# Patient Record
Sex: FEMALE | Race: WHITE | NOT HISPANIC OR LATINO | Employment: OTHER | URBAN - METROPOLITAN AREA
[De-identification: names, ages, dates, MRNs, and addresses within clinical notes are randomized per-mention and may not be internally consistent; named-entity substitution may affect disease eponyms.]

---

## 2017-06-19 ENCOUNTER — TELEPHONE (OUTPATIENT)
Dept: HEMATOLOGY/ONCOLOGY | Facility: CLINIC | Age: 57
End: 2017-06-19

## 2017-06-19 DIAGNOSIS — D05.11 INTRADUCTAL CARCINOMA IN SITU OF RIGHT BREAST: Primary | ICD-10-CM

## 2017-06-19 NOTE — TELEPHONE ENCOUNTER
----- Message from Tonya Augustine sent at 6/19/2017  8:33 AM CDT -----  Pt called in would like new standing orders called into [facility] in [place]

## 2017-06-22 LAB
ALBUMIN SERPL-MCNC: 4.3 G/DL (ref 3.6–5.1)
ALBUMIN/GLOB SERPL: 1.5 (CALC) (ref 1–2.5)
ALP SERPL-CCNC: 68 U/L (ref 33–130)
ALT SERPL-CCNC: 19 U/L (ref 6–29)
AST SERPL-CCNC: 24 U/L (ref 10–35)
BASOPHILS # BLD AUTO: 31 CELLS/UL (ref 0–200)
BASOPHILS NFR BLD AUTO: 0.6 %
BILIRUB SERPL-MCNC: 0.5 MG/DL (ref 0.2–1.2)
BUN SERPL-MCNC: 17 MG/DL (ref 7–25)
BUN/CREAT SERPL: ABNORMAL (CALC) (ref 6–22)
CALCIUM SERPL-MCNC: 9.6 MG/DL (ref 8.6–10.4)
CHLORIDE SERPL-SCNC: 103 MMOL/L (ref 98–110)
CO2 SERPL-SCNC: 29 MMOL/L (ref 20–31)
CREAT SERPL-MCNC: 0.8 MG/DL (ref 0.5–1.05)
EOSINOPHIL # BLD AUTO: 156 CELLS/UL (ref 15–500)
EOSINOPHIL NFR BLD AUTO: 3 %
ERYTHROCYTE [DISTWIDTH] IN BLOOD BY AUTOMATED COUNT: 15.1 % (ref 11–15)
GFR SERPL CREATININE-BSD FRML MDRD: 83 ML/MIN/1.73M2
GLOBULIN SER CALC-MCNC: 2.8 G/DL (CALC) (ref 1.9–3.7)
GLUCOSE SERPL-MCNC: 142 MG/DL (ref 65–99)
HCT VFR BLD AUTO: 42.8 % (ref 35–45)
HGB BLD-MCNC: 14 G/DL (ref 11.7–15.5)
LYMPHOCYTES # BLD AUTO: 1591 CELLS/UL (ref 850–3900)
LYMPHOCYTES NFR BLD AUTO: 30.6 %
MCH RBC QN AUTO: 26.6 PG (ref 27–33)
MCHC RBC AUTO-ENTMCNC: 32.6 G/DL (ref 32–36)
MCV RBC AUTO: 81.6 FL (ref 80–100)
MONOCYTES # BLD AUTO: 333 CELLS/UL (ref 200–950)
MONOCYTES NFR BLD AUTO: 6.4 %
NEUTROPHILS # BLD AUTO: 3089 CELLS/UL (ref 1500–7800)
NEUTROPHILS NFR BLD AUTO: 59.4 %
PLATELET # BLD AUTO: 220 THOUSAND/UL (ref 140–400)
PMV BLD REES-ECKER: 9.6 FL (ref 7.5–12.5)
POTASSIUM SERPL-SCNC: 4.2 MMOL/L (ref 3.5–5.3)
PROT SERPL-MCNC: 7.1 G/DL (ref 6.1–8.1)
RBC # BLD AUTO: 5.24 MILLION/UL (ref 3.8–5.1)
SODIUM SERPL-SCNC: 139 MMOL/L (ref 135–146)
WBC # BLD AUTO: 5.2 THOUSAND/UL (ref 3.8–10.8)

## 2017-07-13 ENCOUNTER — OFFICE VISIT (OUTPATIENT)
Dept: HEMATOLOGY/ONCOLOGY | Facility: CLINIC | Age: 57
End: 2017-07-13
Payer: COMMERCIAL

## 2017-07-13 VITALS
TEMPERATURE: 98 F | HEIGHT: 67 IN | WEIGHT: 200.63 LBS | RESPIRATION RATE: 16 BRPM | BODY MASS INDEX: 31.49 KG/M2 | SYSTOLIC BLOOD PRESSURE: 120 MMHG | HEART RATE: 70 BPM | DIASTOLIC BLOOD PRESSURE: 81 MMHG

## 2017-07-13 DIAGNOSIS — D05.11 INTRADUCTAL CARCINOMA IN SITU OF RIGHT BREAST: Chronic | ICD-10-CM

## 2017-07-13 PROCEDURE — 99213 OFFICE O/P EST LOW 20 MIN: CPT | Mod: ,,, | Performed by: INTERNAL MEDICINE

## 2017-07-13 RX ORDER — CHOLECALCIFEROL (VITAMIN D3) 125 MCG
5000 CAPSULE ORAL
COMMUNITY

## 2017-07-13 RX ORDER — MONTELUKAST SODIUM 4 MG/1
4 TABLET, CHEWABLE ORAL
COMMUNITY

## 2017-07-13 RX ORDER — PNV NO.95/FERROUS FUM/FOLIC AC 28MG-0.8MG
TABLET ORAL
COMMUNITY

## 2017-07-13 RX ORDER — HYDROCHLOROTHIAZIDE 25 MG/1
12.5 TABLET ORAL DAILY
Refills: 1 | Status: ON HOLD | COMMUNITY
Start: 2017-07-11 | End: 2019-09-27 | Stop reason: CLARIF

## 2017-07-13 RX ORDER — HYDROXYCHLOROQUINE SULFATE 200 MG/1
200 TABLET, FILM COATED ORAL
COMMUNITY
Start: 2015-12-30

## 2017-07-13 RX ORDER — ACETAMINOPHEN 500 MG
2 TABLET ORAL
COMMUNITY

## 2017-07-13 RX ORDER — BECLOMETHASONE DIPROPIONATE 80 UG/1
AEROSOL, METERED NASAL
COMMUNITY
Start: 2015-11-12 | End: 2021-08-05

## 2017-07-13 NOTE — PROGRESS NOTES
PROGRESS NOTE    Subjective:       Patient ID: Nidia Mckeon is a 56 y.o. female.    Chief Complaint:  No chief complaint on file.      History of Present Illness:   Nidia Mckeon is a 56 y.o. female who presents with a history of dcis of right breast. Here for follow up.  Patient is feeling well. No new complaints.       Family and Social history reviewed and is unchanged from 10/2/2012.       ROS:  Review of Systems   Constitutional: Negative for fever.   Respiratory: Negative for shortness of breath.    Cardiovascular: Negative for chest pain and leg swelling.   Gastrointestinal: Negative for abdominal pain and blood in stool.   Genitourinary: Negative for hematuria.   Skin: Negative for rash.        No current outpatient prescriptions on file.        Objective:       Physical Examination:     There were no vitals taken for this visit.    Physical Exam   Constitutional: She appears well-developed and well-nourished.   HENT:   Head: Normocephalic and atraumatic.   Right Ear: External ear normal.   Left Ear: External ear normal.   Mouth/Throat: Oropharynx is clear and moist.   Eyes: Conjunctivae are normal. Pupils are equal, round, and reactive to light.   Neck: No tracheal deviation present. No thyromegaly present.   Cardiovascular: Normal rate, regular rhythm and normal heart sounds.    Pulmonary/Chest: Effort normal and breath sounds normal.       Abdominal: Soft. Bowel sounds are normal. She exhibits no distension and no mass. There is no tenderness.   Musculoskeletal: She exhibits no edema.   Neurological:   Neuro intact througout   Skin: No rash noted.   Psychiatric: She has a normal mood and affect. Her behavior is normal. Judgment and thought content normal.       Labs:   No results found for this or any previous visit (from the past 336 hour(s)).  CMP  Sodium   Date Value Ref Range Status   06/22/2017 139 135 - 146 mmol/L Final     Potassium   Date Value  Ref Range Status   06/22/2017 4.2 3.5 - 5.3 mmol/L Final     Chloride   Date Value Ref Range Status   06/22/2017 103 98 - 110 mmol/L Final     CO2   Date Value Ref Range Status   06/22/2017 29 20 - 31 mmol/L Final     Glucose   Date Value Ref Range Status   06/22/2017 142 (H) 65 - 99 mg/dL Final     Comment:                   Fasting reference interval     For someone without known diabetes, a glucose  value >125 mg/dL indicates that they may have  diabetes and this should be confirmed with a  follow-up test.          BUN, Bld   Date Value Ref Range Status   06/22/2017 17 7 - 25 mg/dL Final     Creatinine   Date Value Ref Range Status   06/22/2017 0.80 0.50 - 1.05 mg/dL Final     Comment:     For patients >49 years of age, the reference limit  for Creatinine is approximately 13% higher for people  identified as -American.          Calcium   Date Value Ref Range Status   06/22/2017 9.6 8.6 - 10.4 mg/dL Final     Total Protein   Date Value Ref Range Status   06/22/2017 7.1 6.1 - 8.1 g/dL Final     Albumin   Date Value Ref Range Status   06/22/2017 4.3 3.6 - 5.1 g/dL Final     Total Bilirubin   Date Value Ref Range Status   06/22/2017 0.5 0.2 - 1.2 mg/dL Final     Alkaline Phosphatase   Date Value Ref Range Status   06/22/2017 68 33 - 130 U/L Final     AST   Date Value Ref Range Status   06/22/2017 24 10 - 35 U/L Final     ALT   Date Value Ref Range Status   06/22/2017 19 6 - 29 U/L Final     Anion Gap   Date Value Ref Range Status   10/20/2016 15 8 - 16 mmol/L Final     eGFR if    Date Value Ref Range Status   06/22/2017 96 > OR = 60 mL/min/1.73m2 Final     eGFR if non    Date Value Ref Range Status   06/22/2017 83 > OR = 60 mL/min/1.73m2 Final     No results found for: CEA  No results found for: PSA        Assessment/Plan:     Problem List Items Addressed This Visit     DCIS of right breast: s/p bilateral mastectomy 1/23/2012 (Chronic)     S/p bilateral mastectomy  1/23/2012  [id number], Grade III           Other Visit Diagnoses    None.         Discussion:     No Follow-up on file.      Electronically signed by Harish Archuleta

## 2017-07-13 NOTE — ASSESSMENT & PLAN NOTE
S/p bilateral mastectomy 1/23/2012  [id number], Grade III    Patient is beyond five years at this time and doing well.  Can see yearly at this point for exam and history.  Exam negative today.

## 2018-11-13 ENCOUNTER — OFFICE VISIT (OUTPATIENT)
Dept: HEMATOLOGY/ONCOLOGY | Facility: CLINIC | Age: 58
End: 2018-11-13
Payer: COMMERCIAL

## 2018-11-13 VITALS
DIASTOLIC BLOOD PRESSURE: 86 MMHG | BODY MASS INDEX: 34.16 KG/M2 | TEMPERATURE: 98 F | SYSTOLIC BLOOD PRESSURE: 130 MMHG | RESPIRATION RATE: 20 BRPM | HEART RATE: 81 BPM | WEIGHT: 218.13 LBS

## 2018-11-13 DIAGNOSIS — D05.11 INTRADUCTAL CARCINOMA IN SITU OF RIGHT BREAST: Chronic | ICD-10-CM

## 2018-11-13 PROCEDURE — 99213 OFFICE O/P EST LOW 20 MIN: CPT | Mod: ,,, | Performed by: INTERNAL MEDICINE

## 2018-11-13 RX ORDER — BALSALAZIDE DISODIUM 750 MG/1
2250 CAPSULE ORAL 3 TIMES DAILY
Refills: 6 | COMMUNITY
Start: 2018-08-28

## 2018-11-13 NOTE — PROGRESS NOTES
PROGRESS NOTE    Subjective:       Patient ID: Nidia Mckeon is a 58 y.o. female.    Chief Complaint:  Follow-up and Results      History of Present Illness:   Nidia Mckeon is a 58 y.o. female who presents with a history of dcis of right breast. Here for follow up.  Patient is feeling well at this time with no new complaints.        Family and Social history reviewed and is unchanged from 10/2/2012.       ROS:  Review of Systems   Constitutional: Negative for fever.   Respiratory: Negative for shortness of breath.    Cardiovascular: Negative for chest pain and leg swelling.   Gastrointestinal: Negative for abdominal pain and blood in stool.   Genitourinary: Negative for hematuria.   Skin: Negative for rash.          Current Outpatient Medications:     balsalazide (COLAZAL) 750 mg capsule, Take 2,250 mg by mouth 3 (three) times daily., Disp: , Rfl: 6    beclomethasone dipropionate (QNASL) 80 mcg/actuation HFAA, daily. , Disp: , Rfl:     calcium carbonate (CALCIUM 300 ORAL), calcium  qd, Disp: , Rfl:     cholecalciferol, vitamin D3, 5,000 unit capsule, Take 5,000 Units by mouth., Disp: , Rfl:     cyanocobalamin (VITAMIN B-12) 100 MCG tablet, Take by mouth., Disp: , Rfl:     hydrochlorothiazide (HYDRODIURIL) 25 MG tablet, Take 12.5 mg by mouth once daily., Disp: , Rfl: 1    hydroxychloroquine (PLAQUENIL) 200 mg tablet, Take 200 mg by mouth., Disp: , Rfl:     montelukast 4 MG chewable tablet, Take 4 mg by mouth., Disp: , Rfl:     multivitamin capsule, Take 1 capsule by mouth., Disp: , Rfl:     omega 3-dha-epa-fish oil 300-1,000 mg Cap, Take 2 g by mouth., Disp: , Rfl:         Objective:       Physical Examination:     /86   Pulse 81   Temp 97.8 °F (36.6 °C)   Resp 20   Wt 98.9 kg (218 lb 1.6 oz)   BMI 34.16 kg/m²     Physical Exam   Constitutional: She appears well-developed and well-nourished.   HENT:   Head: Normocephalic and atraumatic.    Right Ear: External ear normal.   Left Ear: External ear normal.   Mouth/Throat: Oropharynx is clear and moist.   Eyes: Conjunctivae are normal. Pupils are equal, round, and reactive to light.   Neck: No tracheal deviation present. No thyromegaly present.   Cardiovascular: Normal rate, regular rhythm and normal heart sounds.   Pulmonary/Chest: Effort normal and breath sounds normal.       Abdominal: Soft. Bowel sounds are normal. She exhibits no distension and no mass. There is no tenderness.   Musculoskeletal: She exhibits no edema.   Neurological:   Neuro intact througout   Skin: No rash noted.   Psychiatric: She has a normal mood and affect. Her behavior is normal. Judgment and thought content normal.       Labs:   No results found for this or any previous visit (from the past 336 hour(s)).  CMP  Sodium   Date Value Ref Range Status   06/22/2017 139 135 - 146 mmol/L Final     Potassium   Date Value Ref Range Status   06/22/2017 4.2 3.5 - 5.3 mmol/L Final     Chloride   Date Value Ref Range Status   06/22/2017 103 98 - 110 mmol/L Final     CO2   Date Value Ref Range Status   06/22/2017 29 20 - 31 mmol/L Final     Glucose   Date Value Ref Range Status   06/22/2017 142 (H) 65 - 99 mg/dL Final     Comment:                   Fasting reference interval     For someone without known diabetes, a glucose  value >125 mg/dL indicates that they may have  diabetes and this should be confirmed with a  follow-up test.          BUN, Bld   Date Value Ref Range Status   06/22/2017 17 7 - 25 mg/dL Final     Creatinine   Date Value Ref Range Status   06/22/2017 0.80 0.50 - 1.05 mg/dL Final     Comment:     For patients >49 years of age, the reference limit  for Creatinine is approximately 13% higher for people  identified as -American.          Calcium   Date Value Ref Range Status   06/22/2017 9.6 8.6 - 10.4 mg/dL Final     Total Protein   Date Value Ref Range Status   06/22/2017 7.1 6.1 - 8.1 g/dL Final     Albumin   Date  Value Ref Range Status   06/22/2017 4.3 3.6 - 5.1 g/dL Final     Total Bilirubin   Date Value Ref Range Status   06/22/2017 0.5 0.2 - 1.2 mg/dL Final     Alkaline Phosphatase   Date Value Ref Range Status   06/22/2017 68 33 - 130 U/L Final     AST   Date Value Ref Range Status   06/22/2017 24 10 - 35 U/L Final     ALT   Date Value Ref Range Status   06/22/2017 19 6 - 29 U/L Final     Anion Gap   Date Value Ref Range Status   10/20/2016 15 8 - 16 mmol/L Final     eGFR if    Date Value Ref Range Status   06/22/2017 96 > OR = 60 mL/min/1.73m2 Final     eGFR if non    Date Value Ref Range Status   06/22/2017 83 > OR = 60 mL/min/1.73m2 Final     No results found for: CEA  No results found for: PSA        Assessment/Plan:     Problem List Items Addressed This Visit     DCIS of right breast: s/p bilateral mastectomy 1/23/2012 (Chronic)     Patient is doing well at this time with no evidence of recurrence or new disease.  Exam is negative and labs are unremarkable.  Will continue to see her on a yearly basis.                 Discussion:     Follow-up in about 1 year (around 11/13/2019).      Electronically signed by Harish Archuleta

## 2018-11-13 NOTE — ASSESSMENT & PLAN NOTE
Patient is doing well at this time with no evidence of recurrence or new disease.  Exam is negative and labs are unremarkable.  Will continue to see her on a yearly basis.

## 2018-11-13 NOTE — LETTER
November 13, 2018      Frieda Lee MD  [address] [address]           [facility] - Hematology Oncology  [address]  [address]  [place] [facility] [place] [address]  Phone: [phone]  Fax: [phone]          Patient: Nidia Mckeon   MR Number: [id number]   YOB: 1960   Date of Visit: 11/13/2018       Dear Dr. Frieda Lee:    Thank you for referring Nidia Mckeon to me for evaluation. Attached you will find relevant portions of my assessment and plan of care.    If you have questions, please do not hesitate to call me. I look forward to following Nidia Mckeon along with you.    Sincerely,    Harish Draper MD    Enclosure  CC:  No Recipients    If you would like to receive this communication electronically, please contact [email or web address] or [phone] to request more information on [employer] access.    For providers and/or their staff who would like to refer a patient to [facility], please contact us through our one-stop-shop provider referral line, [facility], at [phone].    If you feel you have received this communication in error or would no longer like to receive these types of communications, please e-mail [email or web address]

## 2019-07-19 PROBLEM — K21.00 ESOPHAGITIS, REFLUX: Status: ACTIVE | Noted: 2019-07-19

## 2019-09-27 PROBLEM — R14.0 ABDOMINAL DISTENTION: Status: ACTIVE | Noted: 2019-09-27

## 2019-11-12 ENCOUNTER — OFFICE VISIT (OUTPATIENT)
Dept: HEMATOLOGY/ONCOLOGY | Facility: CLINIC | Age: 59
End: 2019-11-12
Payer: COMMERCIAL

## 2019-11-12 VITALS
RESPIRATION RATE: 18 BRPM | DIASTOLIC BLOOD PRESSURE: 83 MMHG | HEART RATE: 97 BPM | TEMPERATURE: 98 F | BODY MASS INDEX: 33.67 KG/M2 | WEIGHT: 215 LBS | SYSTOLIC BLOOD PRESSURE: 131 MMHG

## 2019-11-12 DIAGNOSIS — D05.11 INTRADUCTAL CARCINOMA IN SITU OF RIGHT BREAST: Chronic | ICD-10-CM

## 2019-11-12 PROCEDURE — 99213 OFFICE O/P EST LOW 20 MIN: CPT | Mod: S$GLB,,, | Performed by: INTERNAL MEDICINE

## 2019-11-12 PROCEDURE — 99213 PR OFFICE/OUTPT VISIT, EST, LEVL III, 20-29 MIN: ICD-10-PCS | Mod: S$GLB,,, | Performed by: INTERNAL MEDICINE

## 2019-11-12 RX ORDER — METFORMIN HYDROCHLORIDE 500 MG/1
1000 TABLET ORAL
Refills: 1 | COMMUNITY
Start: 2019-11-05

## 2019-11-12 RX ORDER — FLUTICASONE PROPIONATE 50 MCG
2 SPRAY, SUSPENSION (ML) NASAL DAILY
Refills: 1 | COMMUNITY
Start: 2019-09-26

## 2019-11-12 RX ORDER — PANTOPRAZOLE SODIUM 40 MG/1
40 TABLET, DELAYED RELEASE ORAL DAILY
Refills: 1 | COMMUNITY
Start: 2019-10-07 | End: 2021-08-05

## 2019-11-12 RX ORDER — LINACLOTIDE 72 UG/1
CAPSULE, GELATIN COATED ORAL
COMMUNITY
Start: 2019-11-11 | End: 2021-08-05

## 2019-11-12 NOTE — ASSESSMENT & PLAN NOTE
Patient is doing well at this time and she appears MALIKA.  Breast exam is negative and she can continue with yearly follow up.

## 2019-11-12 NOTE — PROGRESS NOTES
PROGRESS NOTE    Subjective:       Patient ID: Nidia Mckeon is a 59 y.o. female.    Chief Complaint:  No chief complaint on file.  dcis follow up    S/p bilateral mastectomy 1/23/2012  [id number], Grade III.       History of Present Illness:   Nidia Mckeon is a 59 y.o. female who presents with a history of dcis of right breast. Here for yearly follow up.  Patient is feeling well at this time with no new complaints.        Family and Social history reviewed and is unchanged from 10/2/2012.       ROS:  Review of Systems   Constitutional: Negative for fever.   Respiratory: Negative for shortness of breath.    Cardiovascular: Negative for chest pain and leg swelling.   Gastrointestinal: Negative for abdominal pain and blood in stool.   Genitourinary: Negative for hematuria.   Skin: Negative for rash.          Current Outpatient Medications:     balsalazide (COLAZAL) 750 mg capsule, Take 2,250 mg by mouth 3 (three) times daily., Disp: , Rfl: 6    beclomethasone dipropionate (QNASL) 80 mcg/actuation HFAA, daily. , Disp: , Rfl:     calcium carbonate (CALCIUM 300 ORAL), calcium  qd, Disp: , Rfl:     cholecalciferol, vitamin D3, 5,000 unit capsule, Take 5,000 Units by mouth., Disp: , Rfl:     cyanocobalamin (VITAMIN B-12) 100 MCG tablet, Take by mouth., Disp: , Rfl:     ESTRIOL, BULK, MISC, , Disp: , Rfl: 11    fluticasone propionate (FLONASE) 50 mcg/actuation nasal spray, 2 sprays by Each Nostril route once daily., Disp: , Rfl: 1    hydroxychloroquine (PLAQUENIL) 200 mg tablet, Take 200 mg by mouth., Disp: , Rfl:     LINZESS 72 mcg Cap capsule, , Disp: , Rfl:     metFORMIN (GLUCOPHAGE) 500 MG tablet, TAKE 1 TABLET BY MOUTH ONCE DAILY WITH A MEAL FOR 90 DAYS, Disp: , Rfl: 1    montelukast 4 MG chewable tablet, Take 4 mg by mouth., Disp: , Rfl:     multivitamin capsule, Take 1 capsule by mouth., Disp: , Rfl:     omega 3-dha-epa-fish oil 300-1,000 mg Cap, Take 2  g by mouth., Disp: , Rfl:     pantoprazole (PROTONIX) 40 MG tablet, Take 40 mg by mouth once daily., Disp: , Rfl: 1        Objective:       Physical Examination:     /83   Pulse 97   Temp 97.9 °F (36.6 °C)   Resp 18   Wt 97.5 kg (215 lb)   BMI 33.67 kg/m²     Physical Exam   Constitutional: She appears well-developed and well-nourished.   HENT:   Head: Normocephalic and atraumatic.   Right Ear: External ear normal.   Left Ear: External ear normal.   Mouth/Throat: Oropharynx is clear and moist.   Eyes: Pupils are equal, round, and reactive to light. Conjunctivae are normal.   Neck: No tracheal deviation present. No thyromegaly present.   Cardiovascular: Normal rate, regular rhythm and normal heart sounds.   Pulmonary/Chest: Effort normal and breath sounds normal.       Abdominal: Soft. Bowel sounds are normal. She exhibits no distension and no mass. There is no tenderness.   Musculoskeletal: She exhibits no edema.   Neurological:   Neuro intact througout   Skin: No rash noted.   Psychiatric: She has a normal mood and affect. Her behavior is normal. Judgment and thought content normal.       Labs:   No results found for this or any previous visit (from the past 336 hour(s)).  CMP  Sodium   Date Value Ref Range Status   08/15/2019 140 136 - 145 mmol/L Final     Potassium   Date Value Ref Range Status   08/15/2019 4.4 3.5 - 5.1 mmol/L Final     Chloride   Date Value Ref Range Status   08/15/2019 103 95 - 110 mmol/L Final     CO2   Date Value Ref Range Status   08/15/2019 29 22 - 31 mmol/L Final     Glucose   Date Value Ref Range Status   08/15/2019 140 (H) 70 - 110 mg/dL Final     Comment:     The ADA recommends the following guidelines for fasting glucose:  Normal:       less than 100 mg/dL  Prediabetes:  100 mg/dL to 125 mg/dL  Diabetes:     126 mg/dL or higher       BUN, Bld   Date Value Ref Range Status   08/15/2019 18 7 - 18 mg/dL Final     Creatinine   Date Value Ref Range Status   08/15/2019 0.70 0.50  - 1.40 mg/dL Final     Calcium   Date Value Ref Range Status   08/15/2019 9.6 8.4 - 10.2 mg/dL Final     Total Protein   Date Value Ref Range Status   08/15/2019 7.0 6.0 - 8.4 g/dL Final     Albumin   Date Value Ref Range Status   08/15/2019 4.1 3.5 - 5.2 g/dL Final     Total Bilirubin   Date Value Ref Range Status   08/15/2019 0.5 0.2 - 1.3 mg/dL Final     Alkaline Phosphatase   Date Value Ref Range Status   08/15/2019 74 38 - 145 U/L Final     AST   Date Value Ref Range Status   08/15/2019 33 14 - 36 U/L Final     ALT   Date Value Ref Range Status   08/15/2019 27 10 - 44 U/L Final     Anion Gap   Date Value Ref Range Status   08/15/2019 8 8 - 16 mmol/L Final     eGFR if    Date Value Ref Range Status   08/15/2019 >60 >60 mL/min/1.73 m^2 Final     eGFR if non    Date Value Ref Range Status   08/15/2019 >60 >60 mL/min/1.73 m^2 Final     Comment:     Calculation used to obtain the estimated glomerular filtration  rate (eGFR) is the CKD-EPI equation.        No results found for: CEA  No results found for: PSA        Assessment/Plan:     Problem List Items Addressed This Visit     DCIS of right breast: s/p bilateral mastectomy 1/23/2012 (Chronic)     Patient is doing well at this time and she appears MALIKA.  Breast exam is negative and she can continue with yearly follow up.                 Discussion:     Follow up in about 1 year (around 11/12/2020).      Electronically signed by Harish Archuleta

## 2020-08-14 ENCOUNTER — LAB VISIT (OUTPATIENT)
Dept: PRIMARY CARE CLINIC | Facility: OTHER | Age: 60
End: 2020-08-14
Attending: INTERNAL MEDICINE
Payer: COMMERCIAL

## 2020-08-14 DIAGNOSIS — R50.9 FEVER: ICD-10-CM

## 2020-08-14 PROCEDURE — U0003 INFECTIOUS AGENT DETECTION BY NUCLEIC ACID (DNA OR RNA); SEVERE ACUTE RESPIRATORY SYNDROME CORONAVIRUS 2 (SARS-COV-2) (CORONAVIRUS DISEASE [COVID-19]), AMPLIFIED PROBE TECHNIQUE, MAKING USE OF HIGH THROUGHPUT TECHNOLOGIES AS DESCRIBED BY CMS-2020-01-R: HCPCS

## 2020-08-17 LAB — SARS-COV-2 RNA RESP QL NAA+PROBE: NOT DETECTED

## 2021-06-07 ENCOUNTER — TELEPHONE (OUTPATIENT)
Dept: HEMATOLOGY/ONCOLOGY | Facility: CLINIC | Age: 61
End: 2021-06-07

## 2021-06-07 DIAGNOSIS — D05.11 INTRADUCTAL CARCINOMA IN SITU OF RIGHT BREAST: Primary | ICD-10-CM

## 2021-06-09 LAB
ALBUMIN SERPL-MCNC: 4.1 G/DL (ref 3.6–5.1)
ALBUMIN/GLOB SERPL: 1.5 (CALC) (ref 1–2.5)
ALP SERPL-CCNC: 51 U/L (ref 37–153)
ALT SERPL-CCNC: 18 U/L (ref 6–29)
AST SERPL-CCNC: 23 U/L (ref 10–35)
BASOPHILS # BLD AUTO: 41 CELLS/UL (ref 0–200)
BASOPHILS NFR BLD AUTO: 0.9 %
BILIRUB SERPL-MCNC: 0.5 MG/DL (ref 0.2–1.2)
BUN SERPL-MCNC: 16 MG/DL (ref 7–25)
BUN/CREAT SERPL: ABNORMAL (CALC) (ref 6–22)
CALCIUM SERPL-MCNC: 9.6 MG/DL (ref 8.6–10.4)
CHLORIDE SERPL-SCNC: 103 MMOL/L (ref 98–110)
CO2 SERPL-SCNC: 29 MMOL/L (ref 20–32)
CREAT SERPL-MCNC: 0.84 MG/DL (ref 0.5–1.05)
EOSINOPHIL # BLD AUTO: 108 CELLS/UL (ref 15–500)
EOSINOPHIL NFR BLD AUTO: 2.4 %
ERYTHROCYTE [DISTWIDTH] IN BLOOD BY AUTOMATED COUNT: 13.9 % (ref 11–15)
GLOBULIN SER CALC-MCNC: 2.8 G/DL (CALC) (ref 1.9–3.7)
GLUCOSE SERPL-MCNC: 153 MG/DL (ref 65–99)
HCT VFR BLD AUTO: 43.8 % (ref 35–45)
HGB BLD-MCNC: 13.9 G/DL (ref 11.7–15.5)
LYMPHOCYTES # BLD AUTO: 1670 CELLS/UL (ref 850–3900)
LYMPHOCYTES NFR BLD AUTO: 37.1 %
MCH RBC QN AUTO: 26.1 PG (ref 27–33)
MCHC RBC AUTO-ENTMCNC: 31.7 G/DL (ref 32–36)
MCV RBC AUTO: 82.2 FL (ref 80–100)
MONOCYTES # BLD AUTO: 419 CELLS/UL (ref 200–950)
MONOCYTES NFR BLD AUTO: 9.3 %
NEUTROPHILS # BLD AUTO: 2264 CELLS/UL (ref 1500–7800)
NEUTROPHILS NFR BLD AUTO: 50.3 %
PLATELET # BLD AUTO: 201 THOUSAND/UL (ref 140–400)
PMV BLD REES-ECKER: 10.3 FL (ref 7.5–12.5)
POTASSIUM SERPL-SCNC: 4.2 MMOL/L (ref 3.5–5.3)
PROT SERPL-MCNC: 6.9 G/DL (ref 6.1–8.1)
RBC # BLD AUTO: 5.33 MILLION/UL (ref 3.8–5.1)
SODIUM SERPL-SCNC: 140 MMOL/L (ref 135–146)
WBC # BLD AUTO: 4.5 THOUSAND/UL (ref 3.8–10.8)

## 2021-08-02 ENCOUNTER — OFFICE VISIT (OUTPATIENT)
Dept: HEMATOLOGY/ONCOLOGY | Facility: CLINIC | Age: 61
End: 2021-08-02
Payer: COMMERCIAL

## 2021-08-02 VITALS
DIASTOLIC BLOOD PRESSURE: 83 MMHG | HEART RATE: 85 BPM | RESPIRATION RATE: 18 BRPM | SYSTOLIC BLOOD PRESSURE: 125 MMHG | BODY MASS INDEX: 32.96 KG/M2 | HEIGHT: 67 IN | WEIGHT: 210 LBS

## 2021-08-02 DIAGNOSIS — D05.11 INTRADUCTAL CARCINOMA IN SITU OF RIGHT BREAST: Chronic | ICD-10-CM

## 2021-08-02 PROCEDURE — 99213 PR OFFICE/OUTPT VISIT, EST, LEVL III, 20-29 MIN: ICD-10-PCS | Mod: S$GLB,,, | Performed by: INTERNAL MEDICINE

## 2021-08-02 PROCEDURE — 99213 OFFICE O/P EST LOW 20 MIN: CPT | Mod: S$GLB,,, | Performed by: INTERNAL MEDICINE

## 2022-06-15 ENCOUNTER — TELEPHONE (OUTPATIENT)
Dept: RHEUMATOLOGY | Facility: CLINIC | Age: 62
End: 2022-06-15

## 2022-07-21 ENCOUNTER — TELEPHONE (OUTPATIENT)
Dept: HEMATOLOGY/ONCOLOGY | Facility: CLINIC | Age: 62
End: 2022-07-21

## 2022-07-21 DIAGNOSIS — D05.11 INTRADUCTAL CARCINOMA IN SITU OF RIGHT BREAST: Primary | ICD-10-CM

## 2022-07-21 NOTE — TELEPHONE ENCOUNTER
Tried to call pt's phone, but the call was rejected. Spoke to pt's . He stated that she is at work, but he will pass the message along to her that I was returning her call about labs.

## 2022-07-21 NOTE — TELEPHONE ENCOUNTER
----- Message from Violeta Araiza sent at 7/21/2022  1:38 PM CDT -----  PT. CALLED NEEDS TO KNOW WHERE TO HAVE LABS DONE BEFORE APPOINTMENT.     CB#[phone]

## 2022-08-01 NOTE — PROGRESS NOTES
PROGRESS NOTE    Subjective:       Patient ID: Nidia Mckeon is a 61 y.o. female.    Chief Complaint:  No chief complaint on file.  dcis follow up    S/p bilateral mastectomy 1/23/2012  [id number], Grade III.       History of Present Illness:   Nidia Mckeon is a 61 y.o. female who presents with a history of dcis of right breast. Here for yearly follow up.      Patient is feeling well at this time with no new complaints.        Family and Social history reviewed and is unchanged from 10/2/2012.       ROS:  Review of Systems   Constitutional: Negative for appetite change, fever and unexpected weight change.   HENT: Negative for mouth sores.    Eyes: Negative for visual disturbance.   Respiratory: Positive for cough. Negative for shortness of breath.    Cardiovascular: Negative for chest pain and leg swelling.   Gastrointestinal: Negative for abdominal pain, blood in stool and diarrhea.   Genitourinary: Negative for frequency and hematuria.   Musculoskeletal: Negative for back pain.   Skin: Negative for rash.   Neurological: Negative for headaches.   Hematological: Negative for adenopathy.   Psychiatric/Behavioral: The patient is not nervous/anxious.           Current Outpatient Medications:     balsalazide (COLAZAL) 750 mg capsule, Take 2,250 mg by mouth 3 (three) times daily., Disp: , Rfl: 6    calcium carbonate (CALCIUM 300 ORAL), calcium  qd, Disp: , Rfl:     cholecalciferol, vitamin D3, 5,000 unit capsule, Take 5,000 Units by mouth., Disp: , Rfl:     cyanocobalamin (VITAMIN B-12) 100 MCG tablet, Take by mouth., Disp: , Rfl:     fluticasone propionate (FLONASE) 50 mcg/actuation nasal spray, 2 sprays by Each Nostril route once daily., Disp: , Rfl: 1    hydroxychloroquine (PLAQUENIL) 200 mg tablet, Take 200 mg by mouth., Disp: , Rfl:     metFORMIN (GLUCOPHAGE) 500 MG tablet, 1,000 mg., Disp: , Rfl: 1    montelukast 4 MG chewable tablet, Take 4 mg by mouth.,  "Disp: , Rfl:     multivitamin capsule, Take 1 capsule by mouth., Disp: , Rfl:     norethindrone ac-eth estradioL (FEMHRT LOW DOSE) 0.5-2.5 mg-mcg per tablet, Take 1 tablet by mouth once daily., Disp: , Rfl:     omega 3-dha-epa-fish oil 300-1,000 mg Cap, Take 2 g by mouth., Disp: , Rfl:     prucalopride succinate (MOTEGRITY ORAL), Take by mouth., Disp: , Rfl:         Objective:       Physical Examination:     BP (!) 163/77   Pulse 80   Temp 98 °F (36.7 °C)   Resp 18   Ht 5' 7" (1.702 m)   Wt 92.5 kg (204 lb)   BMI 31.95 kg/m²     Physical Exam  Constitutional:       Appearance: She is well-developed.   HENT:      Head: Normocephalic and atraumatic.      Right Ear: External ear normal.      Left Ear: External ear normal.   Eyes:      Conjunctiva/sclera: Conjunctivae normal.      Pupils: Pupils are equal, round, and reactive to light.   Neck:      Thyroid: No thyromegaly.      Trachea: No tracheal deviation.   Cardiovascular:      Rate and Rhythm: Normal rate and regular rhythm.      Heart sounds: Normal heart sounds.   Pulmonary:      Effort: Pulmonary effort is normal.      Breath sounds: Normal breath sounds.   Chest:       Abdominal:      General: Bowel sounds are normal. There is no distension.      Palpations: Abdomen is soft. There is no mass.      Tenderness: There is no abdominal tenderness.   Skin:     Findings: No rash.   Neurological:      Comments: Neuro intact througout   Psychiatric:         Behavior: Behavior normal.         Thought Content: Thought content normal.         Judgment: Judgment normal.         Labs:   Recent Results (from the past 336 hour(s))   CBC Auto Differential    Collection Time: 07/22/22  1:58 PM   Result Value Ref Range    WBC 5.45 3.90 - 12.70 K/uL    Hemoglobin 13.9 12.0 - 16.0 g/dL    Hematocrit 42.5 37.0 - 48.5 %    Platelets 194 150 - 450 K/uL     CMP  Sodium   Date Value Ref Range Status   07/22/2022 136 136 - 145 mmol/L Final     Potassium   Date Value Ref Range " Status   07/22/2022 4.0 3.5 - 5.1 mmol/L Final     Chloride   Date Value Ref Range Status   07/22/2022 100 95 - 110 mmol/L Final     CO2   Date Value Ref Range Status   07/22/2022 28 22 - 31 mmol/L Final     Glucose   Date Value Ref Range Status   07/22/2022 101 70 - 110 mg/dL Final     Comment:     The ADA recommends the following guidelines for fasting glucose:    Normal:       less than 100 mg/dL    Prediabetes:  100 mg/dL to 125 mg/dL    Diabetes:     126 mg/dL or higher       BUN   Date Value Ref Range Status   07/22/2022 12 7 - 18 mg/dL Final     Creatinine   Date Value Ref Range Status   07/22/2022 0.76 0.50 - 1.40 mg/dL Final     Calcium   Date Value Ref Range Status   07/22/2022 9.6 8.4 - 10.2 mg/dL Final     Total Protein   Date Value Ref Range Status   07/22/2022 7.8 6.0 - 8.4 g/dL Final     Albumin   Date Value Ref Range Status   07/22/2022 4.7 3.5 - 5.2 g/dL Final     Total Bilirubin   Date Value Ref Range Status   07/22/2022 0.7 0.2 - 1.3 mg/dL Final     Alkaline Phosphatase   Date Value Ref Range Status   07/22/2022 59 38 - 145 U/L Final     AST   Date Value Ref Range Status   07/22/2022 37 (H) 14 - 36 U/L Final     ALT   Date Value Ref Range Status   07/22/2022 24 0 - 35 U/L Final     Anion Gap   Date Value Ref Range Status   07/22/2022 8 8 - 16 mmol/L Final     eGFR if    Date Value Ref Range Status   07/22/2022 >60 >60 mL/min/1.73 m^2 Final     eGFR if non    Date Value Ref Range Status   07/22/2022 >60 >60 mL/min/1.73 m^2 Final     Comment:     Calculation used to obtain the estimated glomerular filtration  rate (eGFR) is the CKD-EPI equation.        No results found for: CEA  No results found for: PSA        Assessment/Plan:     Problem List Items Addressed This Visit     DCIS of right breast: s/p bilateral mastectomy 1/23/2012 (Chronic)     Patient is doing well and appears MALIKA at this time.  Her exam is negative and labs look good as well.  Will continue yearly  follow up and discussed this today.            Relevant Orders    CBC Auto Differential    Comprehensive Metabolic Panel          Discussion:     Follow up in about 1 year (around 8/2/2023).      Electronically signed by Harish Archuleta

## 2022-08-02 ENCOUNTER — OFFICE VISIT (OUTPATIENT)
Dept: HEMATOLOGY/ONCOLOGY | Facility: CLINIC | Age: 62
End: 2022-08-02
Payer: COMMERCIAL

## 2022-08-02 VITALS
WEIGHT: 204 LBS | SYSTOLIC BLOOD PRESSURE: 163 MMHG | TEMPERATURE: 98 F | DIASTOLIC BLOOD PRESSURE: 77 MMHG | RESPIRATION RATE: 18 BRPM | BODY MASS INDEX: 32.02 KG/M2 | HEART RATE: 80 BPM | HEIGHT: 67 IN

## 2022-08-02 DIAGNOSIS — D05.11 INTRADUCTAL CARCINOMA IN SITU OF RIGHT BREAST: Chronic | ICD-10-CM

## 2022-08-02 PROCEDURE — 3078F DIAST BP <80 MM HG: CPT | Mod: CPTII,S$GLB,, | Performed by: INTERNAL MEDICINE

## 2022-08-02 PROCEDURE — 1160F PR REVIEW ALL MEDS BY PRESCRIBER/CLIN PHARMACIST DOCUMENTED: ICD-10-PCS | Mod: CPTII,S$GLB,, | Performed by: INTERNAL MEDICINE

## 2022-08-02 PROCEDURE — 3044F HG A1C LEVEL LT 7.0%: CPT | Mod: CPTII,S$GLB,, | Performed by: INTERNAL MEDICINE

## 2022-08-02 PROCEDURE — 99213 PR OFFICE/OUTPT VISIT, EST, LEVL III, 20-29 MIN: ICD-10-PCS | Mod: S$GLB,,, | Performed by: INTERNAL MEDICINE

## 2022-08-02 PROCEDURE — 3077F PR MOST RECENT SYSTOLIC BLOOD PRESSURE >= 140 MM HG: ICD-10-PCS | Mod: CPTII,S$GLB,, | Performed by: INTERNAL MEDICINE

## 2022-08-02 PROCEDURE — 3008F BODY MASS INDEX DOCD: CPT | Mod: CPTII,S$GLB,, | Performed by: INTERNAL MEDICINE

## 2022-08-02 PROCEDURE — 3077F SYST BP >= 140 MM HG: CPT | Mod: CPTII,S$GLB,, | Performed by: INTERNAL MEDICINE

## 2022-08-02 PROCEDURE — 3078F PR MOST RECENT DIASTOLIC BLOOD PRESSURE < 80 MM HG: ICD-10-PCS | Mod: CPTII,S$GLB,, | Performed by: INTERNAL MEDICINE

## 2022-08-02 PROCEDURE — 1160F RVW MEDS BY RX/DR IN RCRD: CPT | Mod: CPTII,S$GLB,, | Performed by: INTERNAL MEDICINE

## 2022-08-02 PROCEDURE — 1159F MED LIST DOCD IN RCRD: CPT | Mod: CPTII,S$GLB,, | Performed by: INTERNAL MEDICINE

## 2022-08-02 PROCEDURE — 3044F PR MOST RECENT HEMOGLOBIN A1C LEVEL <7.0%: ICD-10-PCS | Mod: CPTII,S$GLB,, | Performed by: INTERNAL MEDICINE

## 2022-08-02 PROCEDURE — 3008F PR BODY MASS INDEX (BMI) DOCUMENTED: ICD-10-PCS | Mod: CPTII,S$GLB,, | Performed by: INTERNAL MEDICINE

## 2022-08-02 PROCEDURE — 1159F PR MEDICATION LIST DOCUMENTED IN MEDICAL RECORD: ICD-10-PCS | Mod: CPTII,S$GLB,, | Performed by: INTERNAL MEDICINE

## 2022-08-02 PROCEDURE — 99213 OFFICE O/P EST LOW 20 MIN: CPT | Mod: S$GLB,,, | Performed by: INTERNAL MEDICINE

## 2022-08-02 NOTE — ASSESSMENT & PLAN NOTE
Patient is doing well and appears MALIKA at this time.  Her exam is negative and labs look good as well.  Will continue yearly follow up and discussed this today.

## 2023-05-29 ENCOUNTER — TELEPHONE (OUTPATIENT)
Dept: RHEUMATOLOGY | Facility: CLINIC | Age: 63
End: 2023-05-29
Payer: COMMERCIAL

## 2023-05-29 NOTE — TELEPHONE ENCOUNTER
----- Message from Fabio Thornton sent at 5/26/2023  9:19 AM CDT -----  Type: Needs Medical Advice  Who Called: Pt  Best Call Back Number: [phone]  Additional Information: Pt is looking to schedule an appt with Dr Howell pl call bk to advise thanks

## 2023-05-29 NOTE — TELEPHONE ENCOUNTER
Patient was informed [facility] location is not scheduling new patients at present. She was given the below information and states she will start with her PCP and Ortho.  [facility] location is not scheduling new patient appointments at this time. A referral is needed for an appointment. If you have a referral in system it will be processed, reviewed and if appropriate for rheumatology appt someone will reach out to schedule. You should reach out to  other [facility] for possible availability   Talha Drake [phone]  [place] [phone]  [place] [phone]

## 2023-07-27 ENCOUNTER — TELEPHONE (OUTPATIENT)
Dept: HEMATOLOGY/ONCOLOGY | Facility: CLINIC | Age: 63
End: 2023-07-27

## 2023-07-27 DIAGNOSIS — D05.11 INTRADUCTAL CARCINOMA IN SITU OF RIGHT BREAST: Primary | ICD-10-CM

## 2023-10-05 ENCOUNTER — TELEPHONE (OUTPATIENT)
Dept: HEMATOLOGY/ONCOLOGY | Facility: CLINIC | Age: 63
End: 2023-10-05

## 2023-10-05 DIAGNOSIS — D05.11 INTRADUCTAL CARCINOMA IN SITU OF RIGHT BREAST: Primary | ICD-10-CM

## 2023-10-17 ENCOUNTER — OFFICE VISIT (OUTPATIENT)
Dept: HEMATOLOGY/ONCOLOGY | Facility: CLINIC | Age: 63
End: 2023-10-17
Payer: COMMERCIAL

## 2023-10-17 VITALS
SYSTOLIC BLOOD PRESSURE: 144 MMHG | DIASTOLIC BLOOD PRESSURE: 65 MMHG | BODY MASS INDEX: 30.61 KG/M2 | TEMPERATURE: 98 F | WEIGHT: 195 LBS | RESPIRATION RATE: 18 BRPM | HEIGHT: 67 IN | HEART RATE: 78 BPM

## 2023-10-17 DIAGNOSIS — D05.11 INTRADUCTAL CARCINOMA IN SITU OF RIGHT BREAST: Chronic | ICD-10-CM

## 2023-10-17 PROCEDURE — 99213 PR OFFICE/OUTPT VISIT, EST, LEVL III, 20-29 MIN: ICD-10-PCS | Mod: S$GLB,,, | Performed by: INTERNAL MEDICINE

## 2023-10-17 PROCEDURE — 99213 OFFICE O/P EST LOW 20 MIN: CPT | Performed by: INTERNAL MEDICINE

## 2023-10-17 PROCEDURE — 99213 OFFICE O/P EST LOW 20 MIN: CPT | Mod: S$GLB,,, | Performed by: INTERNAL MEDICINE

## 2023-10-17 NOTE — PROGRESS NOTES
PROGRESS NOTE    Subjective:       Patient ID: Nidia Mckeon is a 63 y.o. female.    Chief Complaint:  No chief complaint on file.  dcis follow up    S/p bilateral mastectomy 1/23/2012  [id number], Grade III.       History of Present Illness:   Nidia Mckeon is a 63 y.o. female who presents with a history of dcis of right breast. Here for yearly follow up.      Ms. Mckeon is doing well.  No new complaints at this time.         Family and Social history reviewed and is unchanged from 10/2/2012.       ROS:  Review of Systems   Constitutional:  Negative for appetite change, fever and unexpected weight change.   HENT:  Negative for mouth sores.    Eyes:  Negative for visual disturbance.   Respiratory:  Positive for cough. Negative for shortness of breath.    Cardiovascular:  Negative for chest pain and leg swelling.   Gastrointestinal:  Negative for abdominal pain, blood in stool and diarrhea.   Genitourinary:  Negative for frequency and hematuria.   Musculoskeletal:  Negative for back pain.   Skin:  Negative for rash.   Neurological:  Negative for headaches.   Hematological:  Negative for adenopathy.   Psychiatric/Behavioral:  The patient is not nervous/anxious.           Current Outpatient Medications:     balsalazide (COLAZAL) 750 mg capsule, Take 2,250 mg by mouth 3 (three) times daily., Disp: , Rfl: 6    calcium carbonate (CALCIUM 300 ORAL), calcium  qd, Disp: , Rfl:     cholecalciferol, vitamin D3, 5,000 unit capsule, Take 5,000 Units by mouth., Disp: , Rfl:     cyanocobalamin (VITAMIN B-12) 100 MCG tablet, Take by mouth., Disp: , Rfl:     fluticasone propionate (FLONASE) 50 mcg/actuation nasal spray, 2 sprays by Each Nostril route once daily., Disp: , Rfl: 1    hydroCHLOROthiazide (HYDRODIURIL) 25 MG tablet, Take 25 mg by mouth once daily., Disp: , Rfl:     hydroxychloroquine (PLAQUENIL) 200 mg tablet, Take 200 mg by mouth., Disp: , Rfl:     metFORMIN (GLUCOPHAGE)  "500 MG tablet, 1,000 mg., Disp: , Rfl: 1    montelukast 4 MG chewable tablet, Take 4 mg by mouth., Disp: , Rfl:     norethindrone ac-eth estradioL (FEMHRT LOW DOSE) 0.5-2.5 mg-mcg per tablet, Take 1 tablet by mouth once daily., Disp: , Rfl:     omega 3-dha-epa-fish oil 300-1,000 mg Cap, Take 2 g by mouth., Disp: , Rfl:         Objective:       Physical Examination:     BP (!) 144/65   Pulse 78   Temp 97.8 °F (36.6 °C)   Resp 18   Ht 5' 7" (1.702 m)   Wt 88.5 kg (195 lb)   BMI 30.54 kg/m²     Physical Exam  Constitutional:       Appearance: She is well-developed.   HENT:      Head: Normocephalic and atraumatic.      Right Ear: External ear normal.      Left Ear: External ear normal.   Eyes:      Conjunctiva/sclera: Conjunctivae normal.      Pupils: Pupils are equal, round, and reactive to light.   Neck:      Thyroid: No thyromegaly.      Trachea: No tracheal deviation.   Cardiovascular:      Rate and Rhythm: Normal rate and regular rhythm.      Heart sounds: Normal heart sounds.   Pulmonary:      Effort: Pulmonary effort is normal.      Breath sounds: Normal breath sounds.   Chest:       Abdominal:      General: Bowel sounds are normal. There is no distension.      Palpations: Abdomen is soft. There is no mass.      Tenderness: There is no abdominal tenderness.   Skin:     Findings: No rash.   Neurological:      Comments: Neuro intact througout   Psychiatric:         Behavior: Behavior normal.         Thought Content: Thought content normal.         Judgment: Judgment normal.         Labs:   Recent Results (from the past 336 hour(s))   CBC Auto Differential    Collection Time: 10/06/23  3:39 PM   Result Value Ref Range    WBC 8.18 3.90 - 12.70 K/uL    Hemoglobin 13.4 12.0 - 16.0 g/dL    Hematocrit 42.2 37.0 - 48.5 %    Platelets 258 150 - 450 K/uL     CMP  Sodium   Date Value Ref Range Status   10/06/2023 139 136 - 145 mmol/L Final     Potassium   Date Value Ref Range Status   10/06/2023 4.4 3.5 - 5.1 mmol/L " "Final     Comment:     Anion Gap reference range revised on 4/28/2023     Chloride   Date Value Ref Range Status   10/06/2023 102 95 - 110 mmol/L Final     CO2   Date Value Ref Range Status   10/06/2023 28 22 - 31 mmol/L Final     Glucose   Date Value Ref Range Status   10/06/2023 118 (H) 70 - 110 mg/dL Final     Comment:     The ADA recommends the following guidelines for fasting glucose:    Normal:       less than 100 mg/dL    Prediabetes:  100 mg/dL to 125 mg/dL    Diabetes:     126 mg/dL or higher       BUN   Date Value Ref Range Status   10/06/2023 21 (H) 7 - 18 mg/dL Final     Creatinine   Date Value Ref Range Status   10/06/2023 0.76 0.50 - 1.40 mg/dL Final     Calcium   Date Value Ref Range Status   10/06/2023 10.0 8.4 - 10.2 mg/dL Final     Total Protein   Date Value Ref Range Status   10/06/2023 7.8 6.0 - 8.4 g/dL Final     Albumin   Date Value Ref Range Status   10/06/2023 4.6 3.5 - 5.2 g/dL Final     Total Bilirubin   Date Value Ref Range Status   10/06/2023 0.5 0.2 - 1.3 mg/dL Final     Alkaline Phosphatase   Date Value Ref Range Status   10/06/2023 66 38 - 145 U/L Final     AST   Date Value Ref Range Status   10/06/2023 31 14 - 36 U/L Final     ALT   Date Value Ref Range Status   10/06/2023 23 0 - 35 U/L Final     Anion Gap   Date Value Ref Range Status   10/06/2023 9 5 - 12 mmol/L Final     Comment:     Anion Gap reference range revised on 4/28/2023     eGFR if    Date Value Ref Range Status   07/22/2022 >60 >60 mL/min/1.73 m^2 Final     eGFR if non    Date Value Ref Range Status   07/22/2022 >60 >60 mL/min/1.73 m^2 Final     Comment:     Calculation used to obtain the estimated glomerular filtration  rate (eGFR) is the CKD-EPI equation.        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 Mckeon is doing well " today and appears MALIKA at this time.  Exam is negative and I think we can continue with yearly follow up.  Labs good as well.  Discussed with patient today.          Relevant Orders    CBC Auto Differential    Comprehensive Metabolic Panel     Discussion:     Follow up in about 1 year (around 10/17/2024).      Electronically signed by Harish Archuleta

## 2023-10-17 NOTE — ASSESSMENT & PLAN NOTE
Ms. Mckeon is doing well today and appears MALIKA at this time.  Exam is negative and I think we can continue with yearly follow up.  Labs good as well.  Discussed with patient today.

## 2024-07-23 ENCOUNTER — TELEPHONE (OUTPATIENT)
Dept: NEPHROLOGY | Facility: CLINIC | Age: 64
End: 2024-07-23
Payer: COMMERCIAL

## 2024-07-23 NOTE — TELEPHONE ENCOUNTER
----- Message from Stephie Cavazos sent at 7/23/2024  9:34 AM CDT -----  Name of Who is Calling: Ludmila Mondragon         What is the request in detail:Ludmila Mondragon  would like a callback from the office in regards to discussing orders for Rituxan.Please advise thank you       Can the clinic reply by [facility]:        [facility] Back [facility]:Ludmila [phone] ext [phone]

## 2024-10-24 ENCOUNTER — TELEPHONE (OUTPATIENT)
Facility: CLINIC | Age: 64
End: 2024-10-24
Payer: COMMERCIAL

## 2024-10-24 DIAGNOSIS — D05.11 INTRADUCTAL CARCINOMA IN SITU OF RIGHT BREAST: Primary | ICD-10-CM

## 2024-11-07 ENCOUNTER — TELEPHONE (OUTPATIENT)
Facility: CLINIC | Age: 64
End: 2024-11-07
Payer: COMMERCIAL

## 2024-11-07 NOTE — TELEPHONE ENCOUNTER
Left voicemail to remind patient of upcoming appointment 11/14/24.   Stable on allopurinol which will be continued.  Has had multiple removal surgeries for the same.

## 2024-11-14 ENCOUNTER — OFFICE VISIT (OUTPATIENT)
Facility: CLINIC | Age: 64
End: 2024-11-14
Payer: COMMERCIAL

## 2024-11-14 VITALS
SYSTOLIC BLOOD PRESSURE: 165 MMHG | WEIGHT: 194.63 LBS | DIASTOLIC BLOOD PRESSURE: 82 MMHG | HEART RATE: 78 BPM | TEMPERATURE: 98 F | RESPIRATION RATE: 16 BRPM | BODY MASS INDEX: 30.48 KG/M2

## 2024-11-14 DIAGNOSIS — D05.11 INTRADUCTAL CARCINOMA IN SITU OF RIGHT BREAST: Primary | Chronic | ICD-10-CM

## 2024-11-14 PROCEDURE — 99999 PR PBB SHADOW E&M-EST. PATIENT-LVL II: CPT | Mod: PBBFAC,,, | Performed by: INTERNAL MEDICINE

## 2024-11-14 PROCEDURE — 99213 OFFICE O/P EST LOW 20 MIN: CPT | Mod: S$GLB,,, | Performed by: INTERNAL MEDICINE

## 2024-11-14 PROCEDURE — 3008F BODY MASS INDEX DOCD: CPT | Mod: CPTII,S$GLB,, | Performed by: INTERNAL MEDICINE

## 2024-11-14 PROCEDURE — 3077F SYST BP >= 140 MM HG: CPT | Mod: CPTII,S$GLB,, | Performed by: INTERNAL MEDICINE

## 2024-11-14 PROCEDURE — 3079F DIAST BP 80-89 MM HG: CPT | Mod: CPTII,S$GLB,, | Performed by: INTERNAL MEDICINE

## 2024-11-14 RX ORDER — PANTOPRAZOLE SODIUM 40 MG/1
40 TABLET, DELAYED RELEASE ORAL 2 TIMES DAILY
COMMUNITY
Start: 2024-11-01

## 2024-11-14 NOTE — PROGRESS NOTES
PROGRESS NOTE    Subjective:       Patient ID: Nidia Mckeon is a 64 y.o. female.    Chief Complaint:  No chief complaint on file.  dcis follow up    S/p bilateral mastectomy 1/23/2012  [id number], Grade III.       History of Present Illness:   Nidia Mckeon is a 64 y.o. female who presents with a history of dcis of right breast. Here for yearly follow up.      Ms. Mckeon is doing well.  No new complaints at this time.         Family and Social history reviewed and is unchanged from 10/2/2012.       ROS:  Review of Systems   Constitutional:  Negative for appetite change, fever and unexpected weight change.   HENT:  Negative for mouth sores.    Eyes:  Negative for visual disturbance.   Respiratory:  Positive for cough. Negative for shortness of breath.    Cardiovascular:  Negative for chest pain and leg swelling.   Gastrointestinal:  Negative for abdominal pain, blood in stool and diarrhea.   Genitourinary:  Negative for frequency and hematuria.   Musculoskeletal:  Negative for back pain.   Skin:  Negative for rash.   Neurological:  Negative for headaches.   Hematological:  Negative for adenopathy.   Psychiatric/Behavioral:  The patient is not nervous/anxious.           Current Outpatient Medications:     pantoprazole (PROTONIX) 40 MG tablet, Take 40 mg by mouth 2 (two) times daily., Disp: , Rfl:     calcium carbonate (CALCIUM 300 ORAL), calcium  qd, Disp: , Rfl:     cholecalciferol, vitamin D3, 5,000 unit capsule, Take 5,000 Units by mouth., Disp: , Rfl:     fluticasone propionate (FLONASE) 50 mcg/actuation nasal spray, 2 sprays by Each Nostril route once daily., Disp: , Rfl: 1    hydroxychloroquine (PLAQUENIL) 200 mg tablet, Take 200 mg by mouth., Disp: , Rfl:     metFORMIN (GLUCOPHAGE) 500 MG tablet, 1,000 mg., Disp: , Rfl: 1    montelukast 4 MG chewable tablet, Take 4 mg by mouth., Disp: , Rfl:     omega 3-dha-epa-fish oil 300-1,000 mg Cap, Take 2 g by mouth., Disp:  , Rfl:         Objective:       Physical Examination:     BP (!) 165/82   Pulse 78   Temp 97.9 °F (36.6 °C)   Resp 16   Wt 88.3 kg (194 lb 9.6 oz)   BMI 30.48 kg/m²     Physical Exam  Constitutional:       Appearance: She is well-developed.   HENT:      Head: Normocephalic and atraumatic.      Right Ear: External ear normal.      Left Ear: External ear normal.   Eyes:      Conjunctiva/sclera: Conjunctivae normal.      Pupils: Pupils are equal, round, and reactive to light.   Neck:      Thyroid: No thyromegaly.      Trachea: No tracheal deviation.   Cardiovascular:      Rate and Rhythm: Normal rate and regular rhythm.      Heart sounds: Normal heart sounds.   Pulmonary:      Effort: Pulmonary effort is normal.      Breath sounds: Normal breath sounds.   Chest:       Abdominal:      General: Bowel sounds are normal. There is no distension.      Palpations: Abdomen is soft. There is no mass.      Tenderness: There is no abdominal tenderness.   Skin:     Findings: No rash.   Neurological:      Comments: Neuro intact througout   Psychiatric:         Behavior: Behavior normal.         Thought Content: Thought content normal.         Judgment: Judgment normal.         Labs:   Recent Results (from the past 2 weeks)   CBC W/ AUTO DIFFERENTIAL    Collection Time: 11/04/24  7:52 AM   Result Value Ref Range    WBC 4.57 3.90 - 12.70 K/uL    Hemoglobin 13.6 12.0 - 16.0 g/dL    Hematocrit 42.0 37.0 - 48.5 %    Platelets 186 150 - 450 K/uL     CMP  Sodium   Date Value Ref Range Status   11/04/2024 140 136 - 145 mmol/L Final     Potassium   Date Value Ref Range Status   11/04/2024 4.4 3.5 - 5.1 mmol/L Final     Comment:     Anion Gap reference range revised on 4/28/2023     Chloride   Date Value Ref Range Status   11/04/2024 107 95 - 110 mmol/L Final     CO2   Date Value Ref Range Status   11/04/2024 30 22 - 31 mmol/L Final     Glucose   Date Value Ref Range Status   11/04/2024 124 (H) 70 - 110 mg/dL Final     Comment:     The  "ADA recommends the following guidelines for fasting glucose:    Normal:       less than 100 mg/dL    Prediabetes:  100 mg/dL to 125 mg/dL    Diabetes:     126 mg/dL or higher       BUN   Date Value Ref Range Status   11/04/2024 22 (H) 7 - 18 mg/dL Final     Creatinine   Date Value Ref Range Status   11/04/2024 0.73 0.50 - 1.40 mg/dL Final     Calcium   Date Value Ref Range Status   11/04/2024 9.6 8.4 - 10.2 mg/dL Final     Total Protein   Date Value Ref Range Status   11/04/2024 6.5 6.0 - 8.4 g/dL Final     Albumin   Date Value Ref Range Status   11/04/2024 4.0 3.5 - 5.2 g/dL Final     Total Bilirubin   Date Value Ref Range Status   11/04/2024 0.6 0.2 - 1.3 mg/dL Final     Alkaline Phosphatase   Date Value Ref Range Status   11/04/2024 56 38 - 145 U/L Final     AST   Date Value Ref Range Status   11/04/2024 31 14 - 36 U/L Final     ALT   Date Value Ref Range Status   11/04/2024 20 0 - 35 U/L Final     Anion Gap   Date Value Ref Range Status   11/04/2024 3 (L) 5 - 12 mmol/L Final     Comment:     Anion Gap reference range revised on 4/28/2023     eGFR if    Date Value Ref Range Status   07/22/2022 >60 >60 mL/min/1.73 m^2 Final     eGFR if non    Date Value Ref Range Status   07/22/2022 >60 >60 mL/min/1.73 m^2 Final     Comment:     Calculation used to obtain the estimated glomerular filtration  rate (eGFR) is the CKD-EPI equation.        No results found for: "CEA"  No results found for: "PSA"        Assessment/Plan:     Problem List Items Addressed This Visit       DCIS of right breast: s/p bilateral mastectomy 1/23/2012 - Primary (Chronic)     Patient is doing well and appears MALIKA at this time.  Labs look ok and exam is negative.  Will continue to see her on a yearly basis and discussed this today.           Relevant Orders    CBC Auto Differential    Comprehensive Metabolic Panel       Discussion:     Follow up in about 1 year (around 11/14/2025).      Electronically signed by " Harish Archuleta

## 2024-11-14 NOTE — ASSESSMENT & PLAN NOTE
Patient is doing well and appears MALIKA at this time.  Labs look ok and exam is negative.  Will continue to see her on a yearly basis and discussed this today.